# Patient Record
Sex: FEMALE | Race: WHITE | NOT HISPANIC OR LATINO
[De-identification: names, ages, dates, MRNs, and addresses within clinical notes are randomized per-mention and may not be internally consistent; named-entity substitution may affect disease eponyms.]

---
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
## 2021-02-16 PROBLEM — Z78.9 OTHER SPECIFIED HEALTH STATUS: Chronic | Status: ACTIVE | Noted: 2019-07-25

## 2021-03-09 ENCOUNTER — APPOINTMENT (OUTPATIENT)
Dept: OBGYN | Facility: CLINIC | Age: 21
End: 2021-03-09
Payer: COMMERCIAL

## 2021-03-09 VITALS
OXYGEN SATURATION: 99 % | RESPIRATION RATE: 16 BRPM | BODY MASS INDEX: 19.29 KG/M2 | HEIGHT: 64 IN | HEART RATE: 70 BPM | DIASTOLIC BLOOD PRESSURE: 66 MMHG | WEIGHT: 113 LBS | SYSTOLIC BLOOD PRESSURE: 108 MMHG

## 2021-03-09 DIAGNOSIS — Z30.011 ENCOUNTER FOR INITIAL PRESCRIPTION OF CONTRACEPTIVE PILLS: ICD-10-CM

## 2021-03-09 DIAGNOSIS — Z01.419 ENCOUNTER FOR GYNECOLOGICAL EXAMINATION (GENERAL) (ROUTINE) W/OUT ABNORMAL FINDINGS: ICD-10-CM

## 2021-03-09 DIAGNOSIS — Z30.09 ENCOUNTER FOR OTHER GENERAL COUNSELING AND ADVICE ON CONTRACEPTION: ICD-10-CM

## 2021-03-09 DIAGNOSIS — Z86.39 PERSONAL HISTORY OF OTHER ENDOCRINE, NUTRITIONAL AND METABOLIC DISEASE: ICD-10-CM

## 2021-03-09 PROBLEM — Z00.00 ENCOUNTER FOR PREVENTIVE HEALTH EXAMINATION: Status: ACTIVE | Noted: 2021-03-09

## 2021-03-09 PROCEDURE — 99385 PREV VISIT NEW AGE 18-39: CPT

## 2021-03-09 PROCEDURE — 99072 ADDL SUPL MATRL&STAF TM PHE: CPT

## 2021-03-09 NOTE — HISTORY OF PRESENT ILLNESS
[FreeTextEntry1] : Patient is a 20-year-old female presents for routine annual gynecologic examination and requests oral contraceptives. Discussed this with the patient in detail. Risks benefits and alternatives reviewed and instructions given. Patient has no complaints

## 2021-03-09 NOTE — PHYSICAL EXAM
[Appropriately responsive] : appropriately responsive [Alert] : alert [No Acute Distress] : no acute distress [No Lymphadenopathy] : no lymphadenopathy [Regular Rate Rhythm] : regular rate rhythm [No Murmurs] : no murmurs [Clear to Auscultation B/L] : clear to auscultation bilaterally [Soft] : soft [Non-tender] : non-tender [Non-distended] : non-distended [No HSM] : No HSM [No Lesions] : no lesions [No Mass] : no mass [Oriented x3] : oriented x3 [Examination Of The Breasts] : a normal appearance [No Masses] : no breast masses were palpable [Labia Majora] : normal [Labia Minora] : normal [Normal] : normal [Uterine Adnexae] : normal [FreeTextEntry5] : pap done

## 2021-03-10 LAB
C TRACH RRNA SPEC QL NAA+PROBE: NOT DETECTED
N GONORRHOEA RRNA SPEC QL NAA+PROBE: NOT DETECTED
SOURCE TP AMPLIFICATION: NORMAL

## 2021-03-13 LAB — CYTOLOGY CVX/VAG DOC THIN PREP: NORMAL

## 2021-03-19 ENCOUNTER — NON-APPOINTMENT (OUTPATIENT)
Age: 21
End: 2021-03-19

## 2021-03-19 DIAGNOSIS — N76.0 ACUTE VAGINITIS: ICD-10-CM

## 2021-08-17 ENCOUNTER — APPOINTMENT (OUTPATIENT)
Dept: OBGYN | Facility: CLINIC | Age: 21
End: 2021-08-17
Payer: COMMERCIAL

## 2021-08-17 VITALS — SYSTOLIC BLOOD PRESSURE: 110 MMHG | DIASTOLIC BLOOD PRESSURE: 70 MMHG

## 2021-08-17 DIAGNOSIS — T38.4X5A ADVERSE EFFECT OF ORAL CONTRACEPTIVES, INITIAL ENCOUNTER: ICD-10-CM

## 2021-08-17 DIAGNOSIS — N89.8 OTHER SPECIFIED NONINFLAMMATORY DISORDERS OF VAGINA: ICD-10-CM

## 2021-08-17 PROCEDURE — 99214 OFFICE O/P EST MOD 30 MIN: CPT

## 2021-08-17 NOTE — COUNSELING
[Nutrition/ Exercise/ Weight Management] : nutrition, exercise, weight management [Vitamins/Supplements] : vitamins/supplements [FreeTextEntry2] : medication management

## 2021-08-17 NOTE — HISTORY OF PRESENT ILLNESS
[FreeTextEntry1] : Patient is a 20-year-old female who presents for a followup visit with complaints of having stopped her oral contraceptive pills in March of 2021 secondary to adverse effects including headaches and skin issues. Patient prefers to not continue OCPs at this time. Patient also with complaints of vaginal irritation. Patient used Monistat several weeks ago and would like to be checked. Discussed contraception with patient.

## 2021-08-17 NOTE — PHYSICAL EXAM
[Labia Majora] : normal [Labia Minora] : normal [Normal] : normal [Uterine Adnexae] : normal [FreeTextEntry4] : culture done

## 2021-08-18 LAB
C TRACH RRNA SPEC QL NAA+PROBE: NOT DETECTED
CANDIDA VAG CYTO: NOT DETECTED
G VAGINALIS+PREV SP MTYP VAG QL MICRO: NOT DETECTED
N GONORRHOEA RRNA SPEC QL NAA+PROBE: NOT DETECTED
SOURCE AMPLIFICATION: NORMAL
T VAGINALIS VAG QL WET PREP: NOT DETECTED

## 2022-03-16 ENCOUNTER — TRANSCRIPTION ENCOUNTER (OUTPATIENT)
Age: 22
End: 2022-03-16

## 2022-09-27 NOTE — BRIEF OPERATIVE NOTE - NSICDXBRIEFPREOP_GEN_ALL_CORE_FT
Depth Of Biopsy: dermis PRE-OP DIAGNOSIS:  Tonsillitis, chronic 26-Jul-2019 13:37:53  Aayush Koehler

## 2023-07-14 ENCOUNTER — APPOINTMENT (OUTPATIENT)
Dept: INTERNAL MEDICINE | Facility: CLINIC | Age: 23
End: 2023-07-14
Payer: COMMERCIAL

## 2023-07-14 VITALS
BODY MASS INDEX: 17.93 KG/M2 | WEIGHT: 105 LBS | OXYGEN SATURATION: 98 % | SYSTOLIC BLOOD PRESSURE: 118 MMHG | HEART RATE: 93 BPM | RESPIRATION RATE: 16 BRPM | TEMPERATURE: 98.2 F | DIASTOLIC BLOOD PRESSURE: 62 MMHG | HEIGHT: 64 IN

## 2023-07-14 DIAGNOSIS — Z83.79 FAMILY HISTORY OF OTHER DISEASES OF THE DIGESTIVE SYSTEM: ICD-10-CM

## 2023-07-14 DIAGNOSIS — Z83.438 FAMILY HISTORY OF OTHER DISORDER OF LIPOPROTEIN METABOLISM AND OTHER LIPIDEMIA: ICD-10-CM

## 2023-07-14 DIAGNOSIS — Z83.49 FAMILY HISTORY OF OTHER ENDOCRINE, NUTRITIONAL AND METABOLIC DISEASES: ICD-10-CM

## 2023-07-14 DIAGNOSIS — Z82.49 FAMILY HISTORY OF ISCHEMIC HEART DISEASE AND OTHER DISEASES OF THE CIRCULATORY SYSTEM: ICD-10-CM

## 2023-07-14 DIAGNOSIS — Z00.00 ENCOUNTER FOR GENERAL ADULT MEDICAL EXAMINATION W/OUT ABNORMAL FINDINGS: ICD-10-CM

## 2023-07-14 PROCEDURE — 99385 PREV VISIT NEW AGE 18-39: CPT

## 2023-07-14 RX ORDER — CLOTRIMAZOLE AND BETAMETHASONE DIPROPIONATE 10; .5 MG/G; MG/G
1-0.05 CREAM TOPICAL TWICE DAILY
Qty: 1 | Refills: 1 | Status: DISCONTINUED | COMMUNITY
Start: 2021-08-17 | End: 2023-07-14

## 2023-07-14 RX ORDER — FLUCONAZOLE 150 MG/1
150 TABLET ORAL
Qty: 2 | Refills: 0 | Status: DISCONTINUED | COMMUNITY
Start: 2021-03-19 | End: 2023-07-14

## 2023-07-14 RX ORDER — NORETHINDRONE ACETATE AND ETHINYL ESTRADIOL AND FERROUS FUMARATE 1MG-20(24)
1-20 KIT ORAL
Qty: 90 | Refills: 1 | Status: DISCONTINUED | COMMUNITY
Start: 2021-03-09 | End: 2023-07-14

## 2023-07-14 NOTE — PHYSICAL EXAM
[No Lymphadenopathy] : no lymphadenopathy [Supple] : supple [No Edema] : there was no peripheral edema [Normal Supraclavicular Nodes] : no supraclavicular lymphadenopathy [Normal Anterior Cervical Nodes] : no anterior cervical lymphadenopathy [No Focal Deficits] : no focal deficits [Normal Gait] : normal gait [Speech Grossly Normal] : speech grossly normal [Memory Grossly Normal] : memory grossly normal [Normal Affect] : the affect was normal [Normal Mood] : the mood was normal [Normal] : affect was normal and insight and judgment were intact [No Acute Distress] : no acute distress [Well-Appearing] : well-appearing [de-identified] : nasal sounding voice [de-identified] : left great toe subungal hematoma, Left great toe with partial nail in place- previous toe nail fell off 2/2 trauma.. no evidence of ingrown toe nail . The cuticle displays no drainage or erythema

## 2023-07-14 NOTE — REVIEW OF SYSTEMS
[Patient Intake Form Reviewed] : Patient intake form was reviewed [Cough] : cough [Negative] : Heme/Lymph [de-identified] : nail deformities on great toes

## 2023-07-14 NOTE — HISTORY OF PRESENT ILLNESS
[FreeTextEntry1] : Pt being seen for an annual physical and would like to discuss some bloodwork, a cough that shes had and something with her toenail.  [de-identified] : LEE REEVES is a 22 year F who presents today for an Annual Physical. She was previously getting her care from [facility]. She is concerned about a cough she has had for several weeks. Thought it would go away-thought allergies but now almost 1 month. At first went to [facility]- no specific treatment given-as they thought it was self-limited. She did report having fever the first few days.  She feels like there is PND in the back of her throat.. Denies any GERD symptoms. \par

## 2023-07-14 NOTE — HEALTH RISK ASSESSMENT
[2 - 4 times a month (2 pts)] : 2-4 times a month (2 points) [1 or 2 (0 pts)] : 1 or 2 (0 points) [Never (0 pts)] : Never (0 points) [No] : In the past 12 months have you used drugs other than those required for medical reasons? No [No falls in past year] : Patient reported no falls in the past year [0] : 2) Feeling down, depressed, or hopeless: Not at all (0) [PHQ-2 Negative - No further assessment needed] : PHQ-2 Negative - No further assessment needed [Student] : student [College] : College [Single] : single [# Of Children ___] : has [unfilled] children [Fully functional (bathing, dressing, toileting, transferring, walking, feeding)] : Fully functional (bathing, dressing, toileting, transferring, walking, feeding) [Fully functional (using the telephone, shopping, preparing meals, housekeeping, doing laundry, using] : Fully functional and needs no help or supervision to perform IADLs (using the telephone, shopping, preparing meals, housekeeping, doing laundry, using transportation, managing medications and managing finances) [Smoke Detector] : smoke detector [Never] : Never [Audit-CScore] : 2 [[id number]] : 0 [Change in mental status noted] : No change in mental status noted [Reports changes in hearing] : Reports no changes in hearing [Reports changes in vision] : Reports no changes in vision [Reports changes in dental health] : Reports no changes in dental health [MammogramComments] : NA [PapSmearComments] : overdue-will be establishing with her Mother's GYN(Dr. Lucia) [BoneDensityComments] : NA [ColonoscopyComments] : NA [de-identified] : just graduated from [place]

## 2023-08-16 ENCOUNTER — TRANSCRIPTION ENCOUNTER (OUTPATIENT)
Age: 23
End: 2023-08-16

## 2023-09-07 ENCOUNTER — APPOINTMENT (OUTPATIENT)
Dept: INTERNAL MEDICINE | Facility: CLINIC | Age: 23
End: 2023-09-07
Payer: COMMERCIAL

## 2023-09-07 VITALS
HEIGHT: 64.5 IN | OXYGEN SATURATION: 98 % | SYSTOLIC BLOOD PRESSURE: 130 MMHG | HEART RATE: 108 BPM | TEMPERATURE: 98.4 F | BODY MASS INDEX: 17.71 KG/M2 | WEIGHT: 105 LBS | RESPIRATION RATE: 16 BRPM | DIASTOLIC BLOOD PRESSURE: 74 MMHG

## 2023-09-07 DIAGNOSIS — Z78.9 OTHER SPECIFIED HEALTH STATUS: ICD-10-CM

## 2023-09-07 DIAGNOSIS — J04.10 ACUTE TRACHEITIS W/OUT OBSTRUCTION: ICD-10-CM

## 2023-09-07 DIAGNOSIS — J98.01 ACUTE BRONCHOSPASM: ICD-10-CM

## 2023-09-07 DIAGNOSIS — R05.9 COUGH, UNSPECIFIED: ICD-10-CM

## 2023-09-07 DIAGNOSIS — J32.9 CHRONIC SINUSITIS, UNSPECIFIED: ICD-10-CM

## 2023-09-07 PROCEDURE — ZZZZZ: CPT

## 2023-09-07 PROCEDURE — 99205 OFFICE O/P NEW HI 60 MIN: CPT | Mod: 25

## 2023-09-07 PROCEDURE — 94664 DEMO&/EVAL PT USE INHALER: CPT

## 2023-09-07 RX ORDER — PREDNISONE 10 MG/1
10 TABLET ORAL
Qty: 28 | Refills: 0 | Status: DISCONTINUED | COMMUNITY
Start: 2023-07-14 | End: 2023-09-07

## 2023-09-07 RX ORDER — MONTELUKAST 10 MG/1
10 TABLET, FILM COATED ORAL DAILY
Qty: 30 | Refills: 0 | Status: DISCONTINUED | COMMUNITY
Start: 2023-08-16 | End: 2023-09-07

## 2023-09-07 NOTE — HISTORY OF PRESENT ILLNESS
[FreeTextEntry1] : The patient comes in for a new patient pulmonary evaluation [de-identified] : Ms. REEVES is a 22 year -year-old female in previously good medical health, who presents for initial pulmonary evaluation regarding a persistent cough. She states that in May she was exposed to someone who had an upper respiratory infection.  She had a fever for 1 day only.  She went to a medical center where a full w/u returned negative for any respiratory infections.  She was not prescribed any medication, as she was told that the illness was most likely self-limited.    About one month later, she developed a dry cough which was not bothersome.  Again, she may have had an exposure, including issues with the bad air quality situation that was present at that time.  She states that after 7-10 days, the cough became productive with thin mucus. She then consulted with her new PMD, Dr. Snider, who prescribed Prednisone 40 mg tapered down to 10 mg over 9 days.  Prior to to taking the prednisone, the sputum became "thick, chunky, and dark yellowish brown." There were no fevers or chills. She denies any other symptoms being associated with the cough.   She again talked to Dr. Snider about 5 days after the producing thick yellow sputum. She states that the cough improved, and she was now only had a dry cough for a few times per day. This improvement lasted 1-2 weeks until it again worsened. She consulted with Dr. Snider and was prescribed Montelukast Sodium 10 mg daily.  She started this medication on 8/21/2023.  The cough has improved only minimally.  More recently, she now reports that she has the productive cough when she is eating. She denies acid reflux or heart burn symptoms. She does have occasional mild postnasal drip. She denies sinus or congestion. She denies sore throat. She does feel hoarse from coughing.  At times, the cough can be paroxysmal.  It is not worsened when lying in bed.  She does have an active life as she plays tennis and runs on a regular basis. She runs about 20 miles weekly. She denies dyspnea or dyspnea on exertion.  She does not really cough when running.  The remainder of the patient's pulmonary history is negative for cigarette smoking. she denies a past history of pneumonia, asthma, TB, or TB exposure. She denies ankle swelling, paroxysmal nocturnal dyspnea, or orthopnea. She does not have a pet. She has traveled domestically in the last 2 years, including [place], [place], in [place]. She denies exposure to toxic chemicals and/or fumes.  She comes in for this assessment.

## 2023-09-07 NOTE — PLAN
[FreeTextEntry1] : 1. Discontinue Montelukast.   2. Start Prednisone 20 mg, 3 tabs for 3 days, 2 tabs for 3 days, 1 tab for 4 days, half tab for 6 days.   3. Start Doxycycline 100 mg tab twice daily.  4. Start Tessalon Perles 100 mg 2 tabs  Q6prn.  5.  The patient will begin QVAR inhaler 80 mcg 2 puffs twice daily. If the cough improves within one month, the patient will taper down to 2 puffs daily. If the cough improves after two months, the patient will discontinue the QVAR inhaler.  She was given an extensive demonstration on the proper technique in which to use the metered-dose inhaler.  6. Follow up in 3 months with NP Marcella Martínez with pre-post spirometry.  We will assess her response to the above-noted interventions at that time.  7. Maintain exercise regimen as tolerated.  8.  I have told the patient to contact me immediately, if the above-noted symptoms persist or worsen.  At that point, I would consider performing a chest x-ray.

## 2023-09-07 NOTE — PHYSICAL EXAM
[No Acute Distress] : no acute distress [Well Nourished] : well nourished [Well Developed] : well developed [Well-Appearing] : well-appearing [Normal Sclera/Conjunctiva] : normal sclera/conjunctiva [PERRL] : pupils equal round and reactive to light [EOMI] : extraocular movements intact [Normal Outer Ear/Nose] : the outer ears and nose were normal in appearance [Normal Oropharynx] : the oropharynx was normal [No JVD] : no jugular venous distention [Supple] : supple [No Respiratory Distress] : no respiratory distress  [No Accessory Muscle Use] : no accessory muscle use [Clear to Auscultation] : lungs were clear to auscultation bilaterally [Normal Rate] : normal rate  [Regular Rhythm] : with a regular rhythm [Normal S1, S2] : normal S1 and S2 [No Murmur] : no murmur heard [No Edema] : there was no peripheral edema [No Extremity Clubbing/Cyanosis] : no extremity clubbing/cyanosis [Soft] : abdomen soft [Non Tender] : non-tender [Non-distended] : non-distended [No Masses] : no abdominal mass palpated [No HSM] : no HSM [Normal Bowel Sounds] : normal bowel sounds [Normal Supraclavicular Nodes] : no supraclavicular lymphadenopathy [Normal Posterior Cervical Nodes] : no posterior cervical lymphadenopathy [Normal Anterior Cervical Nodes] : no anterior cervical lymphadenopathy [No CVA Tenderness] : no CVA  tenderness [No Spinal Tenderness] : no spinal tenderness [No Rash] : no rash [Coordination Grossly Intact] : coordination grossly intact [Normal Gait] : normal gait [Normal Affect] : the affect was normal [Normal Insight/Judgement] : insight and judgment were intact

## 2023-10-02 RX ORDER — BECLOMETHASONE DIPROPIONATE HFA 80 UG/1
80 AEROSOL, METERED RESPIRATORY (INHALATION) TWICE DAILY
Qty: 3 | Refills: 3 | Status: ACTIVE | COMMUNITY
Start: 2023-09-07 | End: 1900-01-01

## 2023-12-15 ENCOUNTER — APPOINTMENT (OUTPATIENT)
Dept: INTERNAL MEDICINE | Facility: CLINIC | Age: 23
End: 2023-12-15
Payer: COMMERCIAL

## 2023-12-15 ENCOUNTER — NON-APPOINTMENT (OUTPATIENT)
Age: 23
End: 2023-12-15

## 2023-12-15 ENCOUNTER — MED ADMIN CHARGE (OUTPATIENT)
Age: 23
End: 2023-12-15

## 2023-12-15 VITALS
SYSTOLIC BLOOD PRESSURE: 108 MMHG | TEMPERATURE: 98.2 F | OXYGEN SATURATION: 98 % | DIASTOLIC BLOOD PRESSURE: 82 MMHG | BODY MASS INDEX: 16.9 KG/M2 | HEIGHT: 64 IN | HEART RATE: 93 BPM | WEIGHT: 99 LBS

## 2023-12-15 DIAGNOSIS — Z23 ENCOUNTER FOR IMMUNIZATION: ICD-10-CM

## 2023-12-15 DIAGNOSIS — J30.2 OTHER SEASONAL ALLERGIC RHINITIS: ICD-10-CM

## 2023-12-15 PROCEDURE — 94060 EVALUATION OF WHEEZING: CPT

## 2023-12-15 PROCEDURE — 99214 OFFICE O/P EST MOD 30 MIN: CPT | Mod: 25

## 2023-12-15 RX ORDER — BUDESONIDE 0.5 MG/2ML
0.5 INHALANT ORAL TWICE DAILY
Qty: 60 | Refills: 11 | Status: ACTIVE | COMMUNITY
Start: 2023-12-15 | End: 1900-01-01

## 2023-12-15 RX ORDER — PREDNISONE 20 MG/1
20 TABLET ORAL
Qty: 22 | Refills: 0 | Status: DISCONTINUED | COMMUNITY
Start: 2023-09-07 | End: 2023-12-15

## 2023-12-15 RX ORDER — DOXYCYCLINE 100 MG/1
100 TABLET, FILM COATED ORAL TWICE DAILY
Qty: 14 | Refills: 0 | Status: DISCONTINUED | COMMUNITY
Start: 2023-09-07 | End: 2023-12-15

## 2023-12-15 RX ORDER — BENZONATATE 100 MG/1
100 CAPSULE ORAL 4 TIMES DAILY
Qty: 50 | Refills: 3 | Status: DISCONTINUED | COMMUNITY
Start: 2023-09-07 | End: 2023-12-15

## 2023-12-15 NOTE — HISTORY OF PRESENT ILLNESS
[FreeTextEntry1] : The patient comes in today for a routine pulmonary follow-up. [de-identified] : Ms. REEVES is a returning patient who initially presented in September 2023 complaining of persistent cough. At the time, she was treated with 16 days of Prednisone 60 mg tapered to 10 mg, as well as a Qvar Redihaler 2 puffs twice daily.  After 1 month, she decreased the Qvar to 2 puffs daily for another month.  Her symptoms improved, and she stopped taking the Qvar.  Unfortunately, the symptoms began to worsen once again, so she began taking Qvar again 2 puffs once per day. She reports that the cough has remained stable since then.   She is now noting that she has nasal congestion, and she has been producing yellow sputum several times per day.  This is different from the initial cough which was dry and nonproductive. The patient does have a history of seasonal allergies which were more prominent during early teenage years, but have been more stable of late.  She does not use any nasal corticosteroids.  There have been no other acute constitutional symptoms. She comes in for this assessment.

## 2023-12-15 NOTE — PHYSICAL EXAM
[Normal] : no acute distress, well nourished, well developed and well-appearing [Normal Oropharynx] : the oropharynx was normal [No JVD] : no jugular venous distention [Supple] : supple [No Respiratory Distress] : no respiratory distress  [Clear to Auscultation] : lungs were clear to auscultation bilaterally [Normal Rate] : normal rate  [Regular Rhythm] : with a regular rhythm [Normal S1, S2] : normal S1 and S2 [No Edema] : there was no peripheral edema [No Extremity Clubbing/Cyanosis] : no extremity clubbing/cyanosis [Normal Posterior Cervical Nodes] : no posterior cervical lymphadenopathy [Normal Anterior Cervical Nodes] : no anterior cervical lymphadenopathy [Normal Affect] : the affect was normal [Normal Insight/Judgement] : insight and judgment were intact [de-identified] : The nasal mucosa is mildly erythematous bilaterally.  Purulent secretions are not noted

## 2023-12-15 NOTE — PLAN
[FreeTextEntry1] : 1. Sputum culture was taken today.  If it returns abnormal, we will then consider targeting therapy with antibiotics.  2.  Will now decrease the use of the Qvar 80 mcg, down to 1 puff daily.  He will remain on this until she is reevaluated again in the spring  3. Begin instituting daily nasal washes using distilled water, salt, baking soda, and 1 ampule Budesonide, three drops of Americo and Americo baby shampoo, BiD for one month.  If the nasal secretions improved, she will then decrease it to its use once a day.  If the secretions are clear, she can discontinue the use of the shampoo.  She will remain on this nasal wash, until she is reevaluated in the spring.  4. Follow up in four months with pre/post spirometry and FeNO.   5. Follow-up in September with PFT   6. Patient will receive the Influenza vaccine today  7.  Routine medical follow-up with the primary care physician, Dr. Snider.

## 2023-12-18 LAB — BACTERIA SPT CULT: ABNORMAL

## 2024-01-10 PROBLEM — J32.9 SINUSITIS, CHRONIC: Status: ACTIVE | Noted: 2023-12-15

## 2024-01-10 PROBLEM — R05.9 COUGH: Status: ACTIVE | Noted: 2023-07-14

## 2024-01-10 PROBLEM — J98.01 BRONCHOSPASM: Status: ACTIVE | Noted: 2023-09-07

## 2024-04-29 ENCOUNTER — APPOINTMENT (OUTPATIENT)
Dept: INTERNAL MEDICINE | Facility: CLINIC | Age: 24
End: 2024-04-29

## 2024-09-10 ENCOUNTER — APPOINTMENT (OUTPATIENT)
Dept: INTERNAL MEDICINE | Facility: CLINIC | Age: 24
End: 2024-09-10
